# Patient Record
Sex: MALE | Race: WHITE | NOT HISPANIC OR LATINO | Employment: FULL TIME | URBAN - METROPOLITAN AREA
[De-identification: names, ages, dates, MRNs, and addresses within clinical notes are randomized per-mention and may not be internally consistent; named-entity substitution may affect disease eponyms.]

---

## 2017-06-22 ENCOUNTER — OFFICE VISIT (OUTPATIENT)
Dept: FAMILY MEDICINE | Facility: CLINIC | Age: 32
End: 2017-06-22

## 2017-06-22 VITALS — HEART RATE: 86 BPM | DIASTOLIC BLOOD PRESSURE: 76 MMHG | WEIGHT: 189.5 LBS | SYSTOLIC BLOOD PRESSURE: 130 MMHG

## 2017-06-22 DIAGNOSIS — Z23 NEED FOR VACCINATION: Primary | ICD-10-CM

## 2017-06-22 DIAGNOSIS — S61.412A HAND LACERATION, LEFT, INITIAL ENCOUNTER: ICD-10-CM

## 2017-06-22 PROCEDURE — 90471 IMMUNIZATION ADMIN: CPT | Mod: 25 | Performed by: PHYSICIAN ASSISTANT

## 2017-06-22 PROCEDURE — 12004 RPR S/N/AX/GEN/TRK7.6-12.5CM: CPT | Performed by: PHYSICIAN ASSISTANT

## 2017-06-22 PROCEDURE — 90715 TDAP VACCINE 7 YRS/> IM: CPT | Performed by: PHYSICIAN ASSISTANT

## 2017-06-22 ASSESSMENT — ENCOUNTER SYMPTOMS
NEUROLOGICAL NEGATIVE: 1
NECK PAIN: 0
BACK PAIN: 0
HALLUCINATIONS: 0
SHORTNESS OF BREATH: 0
SENSORY CHANGE: 0
CONSTIPATION: 0
ORTHOPNEA: 0
WEIGHT LOSS: 0
SPUTUM PRODUCTION: 0
VOMITING: 0
COUGH: 0
TINGLING: 0
BLOOD IN STOOL: 0
SORE THROAT: 0
DIAPHORESIS: 0
EYE PAIN: 0
FEVER: 0
WEAKNESS: 0
DYSURIA: 0
PALPITATIONS: 0
DOUBLE VISION: 0
FREQUENCY: 0
EYE REDNESS: 0
HEARTBURN: 0
HEMOPTYSIS: 0
EYE DISCHARGE: 0
DEPRESSION: 0
ABDOMINAL PAIN: 0
NAUSEA: 0
BLURRED VISION: 0
FOCAL WEAKNESS: 0
DIZZINESS: 0
LOSS OF CONSCIOUSNESS: 0
SEIZURES: 0
PHOTOPHOBIA: 0
HEADACHES: 0
DIARRHEA: 0
MYALGIAS: 0
WHEEZING: 0
INSOMNIA: 0
NERVOUS/ANXIOUS: 0

## 2017-06-22 ASSESSMENT — LIFESTYLE VARIABLES: SUBSTANCE_ABUSE: 0

## 2017-06-22 NOTE — PROGRESS NOTES
HPI    SUBJECTIVE:                                                    Dandre Alfaro is a 32 year old male who presents to clinic today for 2 lacerations to his left lateral hand. 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 [He states that a roof truss bracket cut him. He denies numbness or tingling and has full function of the hand.    Laceration       Duration: today-About 20 minutes ago     Description (location/character/radiation): Left hand     Intensity:  moderate, severe    Accompanying signs and symptoms: none    History (similar episodes/previous evaluation): None    Precipitating or alleviating factors: None    Therapies tried and outcome: None     Problem list and histories reviewed & adjusted, as indicated.  Additional history: as documented    There is no problem list on file for this patient.    History reviewed. No pertinent surgical history.    Social History   Substance Use Topics     Smoking status: Current Every Day Smoker     Smokeless tobacco: Not on file     Alcohol use Not on file     History reviewed. No pertinent family history.      No current outpatient prescriptions on file.     No Known Allergies  Labs reviewed in EPIC    Reviewed and updated as needed this visit by clinical staff       Reviewed and updated as needed this visit by Provider       Review of Systems   Constitutional: Negative for diaphoresis, fever, malaise/fatigue and weight loss.   HENT: Negative for congestion, ear discharge, ear pain, hearing loss, nosebleeds and sore throat.    Eyes: Negative for blurred vision, double vision, photophobia, pain, discharge and redness.   Respiratory: Negative for cough, hemoptysis, sputum production, shortness of breath and wheezing.    Cardiovascular: Negative for chest pain, palpitations, orthopnea and leg swelling.   Gastrointestinal: Negative for abdominal pain, blood in stool, constipation, diarrhea, heartburn, melena, nausea and vomiting.   Genitourinary:  Negative.  Negative for dysuria, frequency and urgency.   Musculoskeletal: Negative for back pain, joint pain, myalgias and neck pain.   Skin: Negative for itching and rash.        2 Lacerations hand   Neurological: Negative.  Negative for dizziness, tingling, sensory change, focal weakness, seizures, loss of consciousness, weakness and headaches.   Endo/Heme/Allergies: Negative.    Psychiatric/Behavioral: Negative for depression, hallucinations, substance abuse and suicidal ideas. The patient is not nervous/anxious and does not have insomnia.          Physical Exam   Constitutional: He is oriented to person, place, and time and well-developed, well-nourished, and in no distress.   HENT:   Head: Normocephalic and atraumatic.   Right Ear: External ear normal.   Left Ear: External ear normal.   Nose: Nose normal.   Mouth/Throat: Oropharynx is clear and moist.   Eyes: Conjunctivae and EOM are normal. Pupils are equal, round, and reactive to light. Right eye exhibits no discharge. Left eye exhibits no discharge. No scleral icterus.   Neck: Normal range of motion. Neck supple. No thyromegaly present.   Cardiovascular: Normal rate, regular rhythm, normal heart sounds and intact distal pulses.  Exam reveals no gallop and no friction rub.    No murmur heard.  Pulmonary/Chest: Effort normal and breath sounds normal. No respiratory distress. He has no wheezes. He has no rales. He exhibits no tenderness.   Abdominal: Soft. Bowel sounds are normal. He exhibits no distension and no mass. There is no tenderness. There is no rebound and no guarding.   Musculoskeletal: Normal range of motion. He exhibits no edema or tenderness.   Lymphadenopathy:     He has no cervical adenopathy.   Neurological: He is alert and oriented to person, place, and time. He has normal reflexes. No cranial nerve deficit. He exhibits normal muscle tone. Gait normal. Coordination normal.   Skin: Skin is warm and dry. Laceration noted. No rash noted. No  erythema.   There are 2 separate lacerations on his left lateral hand. One is over the hyperthenar area and has a total length of 7 cm. The second laceration is near the ulnar styloid area and is 4 cm in length. Careful inspection reveals no nerve injury, vascular injury, tendon injury or muscle energy. Neurovascular status was intact   Psychiatric: Mood, memory, affect and judgment normal.       (Z23) Need for vaccination  (primary encounter diagnosis)  Comment:  Plan: TDAP VACCINE (ADACEL)            (S61.412A) Hand laceration, left, initial encounter  Comment:   Plan:       Procedure note: The hand and wrist were soaked in Hibiclens solution for 10 minutes and then 1% lidocaine was instilled in the area to provide anesthesia. Careful inspection reveals no vascular, nerve, tendon, muscle injury. The 2 lacerations were repaired using a combination of mattress sutures and running sutures because of the extent of the lacerations. The laceration on the hyperthenar area required for mattress sutures and then a long running suture and the 7 cm long laceration. The laceration on the wrist near the ulnar styloid area required one mattress suture and then a long running suture for the entire 4 cm laceration. Skin approximation was achieved well and bacitracin and sterile dressings were applied.

## 2017-06-22 NOTE — NURSING NOTE
Chief Complaint   Patient presents with     Laceration       Initial /76 (BP Location: Right arm, Patient Position: Chair, Cuff Size: Adult Large)  Pulse 86  Wt 189 lb 8 oz (86 kg) There is no height or weight on file to calculate BMI.  Medication Reconciliation: complete    Health Maintenance that is potentially due pending provider review:  NONE    n/a    Netta DOCKERY CMA

## 2017-06-22 NOTE — MR AVS SNAPSHOT
"              After Visit Summary   2017    Dandre Alfaro    MRN: [id number]           Patient Information     Date Of Birth          1985        Visit Information        Provider Department      2017 3:20 PM Neil Bronson PA-C [facility]        Today's Diagnoses     Need for vaccination    -  1    Hand laceration, left, initial encounter           Follow-ups after your visit        Follow-up notes from your care team     Return in about 10 days (around 2017), or if symptoms worsen or fail to improve.      Who to contact     If you have questions or need follow up information about today's clinic visit or your schedule please contact [facility] directly at [phone].  Normal or non-critical lab and imaging results will be communicated to you by MedAptushart, letter or phone within 4 business days after the clinic has received the results. If you do not hear from us within 7 days, please contact the clinic through MedAptushart or phone. If you have a critical or abnormal lab result, we will notify you by phone as soon as possible.  Submit refill requests through OMsignal or call your pharmacy and they will forward the refill request to us. Please allow 3 business days for your refill to be completed.          Additional Information About Your Visit        MyChart Information     OMsignal lets you send messages to your doctor, view your test results, renew your prescriptions, schedule appointments and more. To sign up, go to [email or web address] . Click on \"Log in\" on the left side of the screen, which will take you to the Welcome page. Then click on \"Sign up Now\" on the right side of the page.     You will be asked to enter the access code listed below, as well as some personal information. Please follow the directions to create your username and password.     Your access code is: [email or web address]  Expires: 2017  4:59 PM     Your access code will  in " 90 days. If you need help or a new code, please call your [facility] clinic or [phone].        Care EveryWhere ID     This is your Care EveryWhere ID. This could be used by other organizations to access your [facility] medical records  [id number]        Your Vitals Were     Pulse                   86            Blood Pressure from Last 3 Encounters:   06/22/17 130/76   06/13/14 112/78    Weight from Last 3 Encounters:   06/22/17 189 lb 8 oz (86 kg)              We Performed the Following     REPAIR INTERMED, WOUND TRUNK/ARM/LEG 2.6-7.5 CM     REPAIR SUPERFICIAL, WOUND BODY 2.6-7.5 CM     TDAP VACCINE (ADACEL)        Primary Care Provider    None Specified       No primary provider on file.        Equal Access to Services     WILFRIDO FLORES : Scott Alvarado, waneil haro, qacharleyta kaalmada orly, madelin reynolds . So [facility] [phone].    ATENCIÓN: Si habla español, tiene a membreno disposición servicios gratuitos de asistencia lingüística. Llame al [phone].    We comply with applicable federal civil rights laws and [place] laws. We do not discriminate on the basis of race, color, national origin, age, disability sex, sexual orientation or gender identity.            Thank you!     Thank you for choosing [facility]  for your care. Our goal is always to provide you with excellent care. Hearing back from our patients is one way we can continue to improve our services. Please take a few minutes to complete the written survey that you may receive in the mail after your visit with us. Thank you!             Your Updated Medication List - Protect others around you: Learn how to safely use, store and throw away your medicines at www.disposemymeds.org.      Notice  As of 6/22/2017  4:59 PM    You have not been prescribed any medications.

## 2017-07-06 ENCOUNTER — ALLIED HEALTH/NURSE VISIT (OUTPATIENT)
Dept: FAMILY MEDICINE | Facility: CLINIC | Age: 32
End: 2017-07-06

## 2017-07-06 DIAGNOSIS — Z48.02 VISIT FOR SUTURE REMOVAL: Primary | ICD-10-CM

## 2017-07-06 PROCEDURE — 99207 ZZC NO CHARGE NURSE ONLY: CPT

## 2017-07-06 NOTE — NURSING NOTE
Dandre presents to the clinic today for  removal of sutures.  The patient has had the sutures in place for 14 days.    There has been no history of infection or drainage.    O: 2 running sutures are seen located on the left hand.  The wound is healing well with no signs of infection.    Tetanus status is up to date.    A: Suture removal.    P:  All sutures were easily removed today.  Routine wound care discussed.  The patient will follow up as needed.

## 2017-07-06 NOTE — MR AVS SNAPSHOT
"              After Visit Summary   2017    Dandre Alfaro    MRN: [id number]           Patient Information     Date Of Birth          1985        Visit Information        Provider Department      2017 2:30 PM FL NB RN [facility]        Today's Diagnoses     Visit for suture removal    -  1       Follow-ups after your visit        Who to contact     If you have questions or need follow up information about today's clinic visit or your schedule please contact [facility] directly at [phone].  Normal or non-critical lab and imaging results will be communicated to you by Touch-Writerhart, letter or phone within 4 business days after the clinic has received the results. If you do not hear from us within 7 days, please contact the clinic through Touch-Writerhart or phone. If you have a critical or abnormal lab result, we will notify you by phone as soon as possible.  Submit refill requests through Monesbat or call your pharmacy and they will forward the refill request to us. Please allow 3 business days for your refill to be completed.          Additional Information About Your Visit        MyChart Information     Monesbat lets you send messages to your doctor, view your test results, renew your prescriptions, schedule appointments and more. To sign up, go to [email or web address] . Click on \"Log in\" on the left side of the screen, which will take you to the Welcome page. Then click on \"Sign up Now\" on the right side of the page.     You will be asked to enter the access code listed below, as well as some personal information. Please follow the directions to create your username and password.     Your access code is: [email or web address]  Expires: 2017  4:59 PM     Your access code will  in 90 days. If you need help or a new code, please call your [facility] or [phone].        Care EveryWhere ID     This is your Care EveryWhere ID. This could be used by other " organizations to access your [facility] medical records  [id number]         Blood Pressure from Last 3 Encounters:   06/22/17 130/76   06/13/14 112/78    Weight from Last 3 Encounters:   06/22/17 189 lb 8 oz (86 kg)              Today, you had the following     No orders found for display       Primary Care Provider    None Specified       No primary provider on file.        Equal Access to Services     [facility]: Hadii aad ku haddelvino Sojohnathan, waaxda luqadaha, qaybta kaalmada philipyada, madelin aliceamiguelelizabeht reynolds . So [facility] [phone].    ATENCIÓN: Si habla español, tiene a membreno disposición servicios gratuitos de asistencia lingüística. Viviana al [phone].    We comply with applicable federal civil rights laws and [place] laws. We do not discriminate on the basis of race, color, national origin, age, disability sex, sexual orientation or gender identity.            Thank you!     Thank you for choosing [facility]  for your care. Our goal is always to provide you with excellent care. Hearing back from our patients is one way we can continue to improve our services. Please take a few minutes to complete the written survey that you may receive in the mail after your visit with us. Thank you!             Your Updated Medication List - Protect others around you: Learn how to safely use, store and throw away your medicines at www.disposemymeds.org.      Notice  As of 7/6/2017  2:44 PM    You have not been prescribed any medications.

## 2023-03-30 ENCOUNTER — NURSE TRIAGE (OUTPATIENT)
Dept: NURSING | Facility: CLINIC | Age: 38
End: 2023-03-30

## 2023-03-30 ENCOUNTER — HOSPITAL ENCOUNTER (EMERGENCY)
Facility: HOSPITAL | Age: 38
Discharge: HOME | End: 2023-03-30
Attending: EMERGENCY MEDICINE | Admitting: EMERGENCY MEDICINE
Payer: MEDICAID

## 2023-03-30 VITALS
HEART RATE: 84 BPM | WEIGHT: 191.2 LBS | TEMPERATURE: 97.7 F | SYSTOLIC BLOOD PRESSURE: 140 MMHG | HEIGHT: 71 IN | BODY MASS INDEX: 26.77 KG/M2 | DIASTOLIC BLOOD PRESSURE: 84 MMHG | OXYGEN SATURATION: 98 % | RESPIRATION RATE: 19 BRPM

## 2023-03-30 DIAGNOSIS — H16.133 UV KERATITIS, BILATERAL: ICD-10-CM

## 2023-03-30 PROCEDURE — 250N000009 HC RX 250: Performed by: EMERGENCY MEDICINE

## 2023-03-30 PROCEDURE — 250N000013 HC RX MED GY IP 250 OP 250 PS 637: Performed by: EMERGENCY MEDICINE

## 2023-03-30 PROCEDURE — 99283 EMERGENCY DEPT VISIT LOW MDM: CPT

## 2023-03-30 RX ORDER — CYCLOPENTOLATE HYDROCHLORIDE 10 MG/ML
1 SOLUTION/ DROPS OPHTHALMIC ONCE
Status: COMPLETED | OUTPATIENT
Start: 2023-03-30 | End: 2023-03-30

## 2023-03-30 RX ORDER — ACETAMINOPHEN 325 MG/1
975 TABLET ORAL ONCE
Status: COMPLETED | OUTPATIENT
Start: 2023-03-30 | End: 2023-03-30

## 2023-03-30 RX ORDER — CYCLOPENTOLATE HYDROCHLORIDE 10 MG/ML
1 SOLUTION/ DROPS OPHTHALMIC 2 TIMES DAILY
Qty: 2 ML | Refills: 0 | Status: SHIPPED | OUTPATIENT
Start: 2023-03-30 | End: 2023-04-01

## 2023-03-30 RX ORDER — TETRACAINE HYDROCHLORIDE 5 MG/ML
1-2 SOLUTION OPHTHALMIC ONCE
Status: COMPLETED | OUTPATIENT
Start: 2023-03-30 | End: 2023-03-30

## 2023-03-30 RX ADMIN — TETRACAINE HYDROCHLORIDE 2 DROP: 5 SOLUTION OPHTHALMIC at 10:42

## 2023-03-30 RX ADMIN — ACETAMINOPHEN 975 MG: 325 TABLET ORAL at 11:02

## 2023-03-30 RX ADMIN — IBUPROFEN 800 MG: 200 TABLET, FILM COATED ORAL at 11:02

## 2023-03-30 RX ADMIN — CYCLOPENTOLATE HYDROCHLORIDE 1 DROP: 10 SOLUTION OPHTHALMIC at 11:03

## 2023-03-30 RX ADMIN — FLUORESCEIN SODIUM 1 STRIP: 1 STRIP OPHTHALMIC at 10:42

## 2023-03-30 ASSESSMENT — ACTIVITIES OF DAILY LIVING (ADL): ADLS_ACUITY_SCORE: 35

## 2023-03-30 ASSESSMENT — ENCOUNTER SYMPTOMS: EYE PAIN: 1

## 2023-03-30 NOTE — Clinical Note
Dandre Alfaro was seen and treated in our emergency department on 3/30/2023.  He may return to work on 04/02/2023.       If you have any questions or concerns, please don't hesitate to call.      Louis Garza MD

## 2023-03-30 NOTE — ED PROVIDER NOTES
EMERGENCY DEPARTMENT ENCOUNTER      NAME: Dandre Alfaro  AGE: 38 year old male  YOB: 1985  MRN: [id number]  EVALUATION DATE & TIME: 3/30/2023 10:26 AM    PCP: No primary care provider on file.    ED PROVIDER: Louis Garza M.D.      Chief Complaint   Patient presents with     Eye Problem         IMPRESSION  1. 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 Wood's lamp with classic pinpoint diffuse uptake to cornea bilaterally with no abrasion, ulceration, foreign body. No concern for infectious process, open globe. Ok for outpatient management. Given NSAIDs, cyclopegics and will continue at home. Patient comfortable with discharge at this time. Return precautions and need for PCP & eye clinic follow up discussed and understood. No further questions at the time of discharge.       --------------------------------------------------------------------------------   --------------------------------------------------------------------------------     10:40 I met with the patient for the initial interview and physical examination. Discussed plan for treatment and workup in the ED.I discussed a plan for discharge with the patient, and patient is agreeable. We discussed supportive cares at home and reasons for return to the ER, including new or worsening symptoms - all questions and concerns addressed. Discharged patient in stable condition and discussed plan for follow up with ophthalmologist.         This patient involved a high degree of complexity in medical decision making, as significant risks were present and assessed. Recent encounters & results in medical record reviewed by me.    All  "workup (i.e. any EKG/labs/imaging as per charting below) reviewed and independently interpreted by me. See respective sections for details.    Broad differential considered for this patient presenting, including but not limited to:  See above    I wore the following PPE during this patient encounter:  N95 mask, face shield w/ eye protection, gloves    See additional MDM below if interested.      MEDICATIONS GIVEN IN THE EMERGENCY DEPARTMENT  Medications   tetracaine (PONTOCAINE) 0.5 % ophthalmic solution 1-2 drop (2 drops Both Eyes $Given 3/30/23 1042)   fluorescein (FUL-LARRY) ophthalmic strip 1 strip (1 strip Both Eyes $Given 3/30/23 1042)   ibuprofen (ADVIL/MOTRIN) tablet 800 mg (800 mg Oral $Given 3/30/23 1102)   acetaminophen (TYLENOL) tablet 975 mg (975 mg Oral $Given 3/30/23 1102)   cyclopentolate (CYCLOGYL) 1 % ophthalmic solution 1 drop (1 drop Both Eyes $Given 3/30/23 1103)       NEW PRESCRIPTIONS STARTED AT TODAY'S ER VISIT  Discharge Medication List as of 3/30/2023 11:04 AM      START taking these medications    Details   cyclopentolate (CYCLOGYL) 1 % ophthalmic solution Place 1 drop into both eyes 2 times daily for 2 days, Disp-2 mL, R-0, Local Print                 =================================================================      HPI  Patient information was obtained from: patient    Use of : N/A         Dandre Alfaro is a 38 year old male with no contributory medical history who presents to this ED by self for evaluation of eye pain. 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 Patient reports bilateral \"cloudy\" vision. Patient denies use of contact lenses or glasses. Patient denies additional medical concerns or complaints at this time.        REVIEW OF SYSTEMS   Review of Systems   Eyes: Positive for pain (bilateral) and visual " "disturbance (cloudy vision bilaterally).   All other systems reviewed and are negative.    All other systems reviewed and are negative except as noted above in HPI.        --------------- MEDICAL HISTORY ---------------  PAST MEDICAL HISTORY:  Reviewed by me.  History reviewed. No pertinent past medical history.  There is no problem list on file for this patient.      PAST SURGICAL HISTORY:  Reviewed by me.  History reviewed. No pertinent surgical history.    CURRENT MEDICATIONS:    Reviewed by me.  No current facility-administered medications for this encounter.    Current Outpatient Medications:      cyclopentolate (CYCLOGYL) 1 % ophthalmic solution, Place 1 drop into both eyes 2 times daily for 2 days, Disp: 2 mL, Rfl: 0    ALLERGIES:  Reviewed by me.  No Known Allergies    FAMILY HISTORY:  Reviewed by me.  History reviewed. No pertinent family history.    SOCIAL HISTORY:   Reviewed by me.       --------------- PHYSICAL EXAM ---------------  Nursing notes and vitals independently reviewed by me.  VITALS:  Vitals:    03/30/23 1024 03/30/23 1025   BP: (!) 140/84    BP Location: Left arm    Pulse: 84    Resp: 19    Temp: 97.7  F (36.5  C)    TempSrc: Temporal    SpO2: 98%    Weight:  86.7 kg (191 lb 3.2 oz)   Height:  1.803 m (5' 11\")       PHYSICAL EXAM:    General:  alert, interactive, no distress  Eyes:  PERRL @ 3mm with painless EOMI, conjugate gaze, no proptosis  - conjunctival injection bilaterally, pinpoint fluorescein uptake to corneae bilaterally with no abrasion or ulceration, negative Jessica sign, no foreign body to upper/lower lids  HENT:  atraumatic, nose with no rhinorrhea, oropharynx clear  Neck:  no meningismus  Cardiovascular:  HR 80s during exam, regular rhythm, no murmurs, brisk cap refill  Chest:  no chest wall tenderness  Pulmonary:  no stridor, normal phonation, normal work of breathing, clear lungs bilaterally  Abdomen:  soft, nondistended, nontender  :  no CVA tenderness  Back:  no midline " spinal tenderness  Musculoskeletal:  no pretibial edema, no calf tenderness. Gross ROM intact to joints of extremities with no obvious deformities.  Skin:  warm, dry, no rash  Neuro:  awake, alert, answers questions appropriately, follows commands, moves all limbs  Psych:  calm, normal affect      --------------- RESULTS ---------------  PROCEDURES:   PROCEDURE: Woods lamp Exam   INDICATIONS: bilateral eye pain   PROCEDURE PROVIDER: Dr Vern Garza   SITE: both eyes   CONSENT:  The risks, benefits and alternatives for this procedure were explained to the patient and verbally accepted.     MEDICATION: fluorescein stain and tetracaine   EXAM FINDINGS: pinpoint fluorescein uptake to corneae bilaterally with no abrasion or ulceration, negative Jessica sign, no foreign body to upper/lower lids   COMPLICATIONS: Patient tolerated procedure well, without complication             --------------- ADDITIONAL MDM ---------------  History:  - Supplemental history from:       -- see above charting, if applicable: patient  - External Record(s) reviewed:       -- see above charting, if applicable    Workup:  - Chart documentation above includes differential considered and any EKGs or imaging independently interpreted by provider.  - In additional to work up documented, I considered the following work up:       -- see above charting, if applicable    External Consultation:  - Discussion of management with another provider:       -- see above charting, if applicable    Complicating Factors:  - Care impacted by chronic illness:       -- see above MDM, past medical history, & problem list  - Care affected by social determinants of health:       -- see above social history    Disposition Considerations:  - Discharge       -- I considered admission given that the patient came to the Emergency Department, but ultimately discharged the patient per decision making above       -- I recommended the patient continue their current prescription  strength medication(s) as charted above in current medications list       -- I prescribed prescription strength medication(s) as charted above       -- I recommended over-the-counter medication(s) as charted above & in discharge instructions         I, Elise Gryler, am serving as a scribe to document services personally performed by Dr. Louis Garza based on my observation and the provider's statements to me. I, Louis Garza MD attest that Elise Gryler is acting in a scribe capacity, has observed my performance of the services and has documented them in accordance with my direction.      Louis Graza MD  03/30/23  Emergency Medicine  [facility] EMERGENCY DEPARTMENT  [address] [place] [id number] [phone]  Dept: [phone]       Louis Garza MD  03/30/23 9759

## 2023-03-30 NOTE — ED TRIAGE NOTES
Bilateral eye  burninhg pain,  Both eyes  Feels blurry.  Appears swollen on eyelids.  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  wearing face shield.

## 2023-03-30 NOTE — ED NOTES
Pt reports everything is blurry and cloudy. Visual acuity completed,20/50 at arms length away.     Reports eyes feeling better after tetracaine administered.

## 2023-03-30 NOTE — TELEPHONE ENCOUNTER
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    Reporting last evening both eyes became sore.    Stating today \"I can hardly see out of them.\"     Both eyes blurry, and  sore.    Advised ED now.    Patient agrees to have someone drive him to [facility] ED now.    Caller verbalized understanding. Denies further questions.    Meenakshi Bello RN  [facility]        Reason for Disposition    Vision is blurred or lost in either eye    Additional Information    Negative: Knocked out (unconscious) > 1 minute    Negative: Sounds like a life-threatening emergency to the triager    Protocols used: EYE INJURY-A-[place]      "

## 2023-03-30 NOTE — DISCHARGE INSTRUCTIONS
Use the eye drops as prescribed. These prevent your pupil from opening/closing and will help with pain. These drops can continue blurred vision though.    As needed for pain control at home, take:  - over-the-counter ibuprofen 800mg by mouth every 8 hours (max dose 2400mg in 24 hours)  - over-the-counter acetaminophen 1g by mouth every 6 hours (max dose 4g in 24 hours)    Follow up with eye clinic in 2 days for a recheck.    Return to the Emergency Department for any vision loss, severe worsening, or any other concerns.